# Patient Record
(demographics unavailable — no encounter records)

---

## 2025-07-14 NOTE — HISTORY OF PRESENT ILLNESS
[FreeTextEntry1] : Mr. DAQUAN NEVES is a 51 man with PMH HTN who was referred to me by Dr. Baez. While being evaluated for headaches, he had neuro imaging performed. MRI brain without evidence of hemorrhage, mass or infarct. 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 He reports severe, intermittent headaches. I personally reviewed his imaging, but resolution is unclear and I cannot clearly see this area of supposed stenosis. He also had a CTA head. Denies TIA or stroke-like symptoms.

## 2025-07-14 NOTE — CONSULT LETTER
[Dear  ___] : Dear  [unfilled], [Consult Letter:] : I had the pleasure of evaluating your patient, [unfilled]. [Please see my note below.] : Please see my note below. [Consult Closing:] : Thank you very much for allowing me to participate in the care of this patient.  If you have any questions, please do not hesitate to contact me. [Sincerely,] : Sincerely, [FreeTextEntry3] : Guillermo Swanson MD Chief, Vascular Neurology and Neurology Service , NeuroEndovascular Surgery Professor of Neurology and Radiology [facility] [employer] at [facility] Director, NeuroEndovascular Surgery [facility]

## 2025-07-14 NOTE — DISCUSSION/SUMMARY
[FreeTextEntry1] : Mr. DAQUAN NEVES is a 51 man with PMH HTN who was referred to me by Dr. Baez for evaluation of moderate focal stenosis of the left PCA, P2-segment reported on MRA and CTA head. I personally reviewed the MRA had and then the CTA head imaging on his phone and I am not certain that stenosis is present. The MRA was from a stand-up MRI and so the quality is poor. Etiology would be atherosclerosis related to his risk factors of HTN and HLD. They will obtain a CD of the CTA and drop if off to my office for a better review. I recommend he begin ASA 81 mg daily for stroke prevention. He will f/u with Dr. Baez for headache management. He can f/u with me in 3 months. All of their questions and concerns were addressed.